# Patient Record
Sex: FEMALE | Race: ASIAN | NOT HISPANIC OR LATINO | URBAN - METROPOLITAN AREA
[De-identification: names, ages, dates, MRNs, and addresses within clinical notes are randomized per-mention and may not be internally consistent; named-entity substitution may affect disease eponyms.]

---

## 2020-04-01 ENCOUNTER — EMERGENCY (EMERGENCY)
Facility: HOSPITAL | Age: 55
LOS: 1 days | Discharge: HOME | End: 2020-04-01
Payer: MEDICAID

## 2020-04-01 VITALS
HEIGHT: 65 IN | TEMPERATURE: 98 F | HEART RATE: 80 BPM | SYSTOLIC BLOOD PRESSURE: 111 MMHG | DIASTOLIC BLOOD PRESSURE: 66 MMHG | WEIGHT: 151.02 LBS | OXYGEN SATURATION: 98 % | RESPIRATION RATE: 18 BRPM

## 2020-04-01 VITALS
RESPIRATION RATE: 18 BRPM | OXYGEN SATURATION: 96 % | DIASTOLIC BLOOD PRESSURE: 85 MMHG | HEART RATE: 78 BPM | SYSTOLIC BLOOD PRESSURE: 132 MMHG

## 2020-04-01 PROCEDURE — 99282 EMERGENCY DEPT VISIT SF MDM: CPT

## 2020-04-01 PROCEDURE — 99284 EMERGENCY DEPT VISIT MOD MDM: CPT

## 2020-04-01 NOTE — ED PROVIDER NOTE - PATIENT PORTAL LINK FT
You can access the FollowMyHealth Patient Portal offered by [facility] by registering at the following website: [email or web address]. By joining [employer]’s FollowMyHealth portal, you will also be able to view your health information using other applications (apps) compatible with our system.

## 2020-04-01 NOTE — ED PROVIDER NOTE - NSFOLLOWUPINSTRUCTIONS_ED_ALL_ED_FT
YOU WERE SEEN IN THE EMERGENCY DEPARTMENT FOR A VIRAL-ILLNESS. AT THIS TIME, COVID-19 CANNOT BE RULED OUT. YOU MUST REMAIN ISOLATED AT HOME FOR 14 DAYS MINIMUM UNTIL TOLD OTHERWISE TO PREVENT FURTHER SPREAD OF THIS DISEASE. ANYONE WHO LIVES WITH YOU MUST ALSO SELF ISOLATE.   PLEASE READ ATTACHED MATERIAL. AVOID ELDERLY AND IMMUNOCOMPROMISED POPULATION. WASH YOUR HANDS, AVOID TOUCHING YOUR FACE, AND SNEEZE/COUGH INTO THE CROOK OF YOUR ARM.   PLEASE READ ATTACHED MATERIAL.    If you wish to be tested for COVID19 you may call the [facility] COVID19 Hotline: [phone]    For fever/body aches:  Take Tylenol 650mg every 6 hrs as needed for pain.  Avoid taking ibuprofen (Advil, Motrin, etc.)    Stay well hydrated with water and electrolyte replacement solutions such as Pedialyte or the adult equivalent.     Return to the ED for worsening symptoms, shortness of breath, chest pain, inability to tolerate food/drink, loss of consciousness, or any other serious concerns.    -------------    What is a coronavirus?  Coronaviruses are a large family of viruses that cause illnesses ranging from the common cold to more severe diseases such as Middle East Respiratory Syndrome (MERS) and Severe Acute Respiratory Syndrome (SARS).    What is Novel Coronavirus (COVID-19)?  The Centers for Disease Control and Prevention (CDC) is closely monitoring the outbreak caused by COVID-19. For the latest information about COVID-19, visit the CDC website at CDC.gov/Coronavirus    How are coronaviruses spread?  Coronaviruses can be transmitted from person to person, usually after close contact with an infected  person (for example, in a household, workplace, or healthcare setting), via droplets that become airborne after a cough or sneeze. These droplets can then infect a nearby person. Transmission can also occur by touching recently contaminated surfaces.    Is there a treatment for a COVID-19?  There is no specific treatment for disease caused by COVID-19. However, many of the symptoms can be treated based on the patient’s clinical condition. Supportive care for infected persons can be highly effective.    What are the symptoms of coronavirus infection?  It depends on the virus, but common signs include fever and/or respiratory symptoms such as cough and shortness of breath. In more severe cases, infection can cause pneumonia, severe acute respiratory syndrome, kidney failure and even death. Fortunately, most cases of COVID-19 have an illness no different than the influenza (flu), with a majority of these patients having mild symptoms and overall mortality which appears to be not much different than the flu.    What can I do to protect myself?  The best precautionary measures:  – washing your hands  – covering your cough  – disinfecting surfaces  – it is also advisable to avoid close contact with anyone showing symptoms of respiratory illness such as coughing and sneezing  – those with symptoms should wear a surgical mask when around others    What can I do to protect those around me?  If you have been identified as someone who may be infected with COVID-19, we recommend you follow the self-isolation procedures outlined on the following page to protect those around you and to limit the spread of this virus.    We recommend the below precautionary steps from now until 14 days from when you returned from your travel or date of your last known possible contact:    — Do not go to work, school or public areas. Avoid using public transportation, ridesharing or taxis.  — As much as possible, separate yourself from other people in your home. If you can, you should stay in a room and away from other people. Also, you should use a separate bathroom if available.  — Wear the supplied mask whenever you are around other people.  — If you have a non-urgent medical appointment, please reschedule for a later date. If the appointment is urgent, please call the health care provider and tell them that you are on self-isolation for possible COVID-19. This will help the health care provider’s office take steps to keep other people from getting infected or exposed. If you can reschedule routine appointments, do so.  — Wash your hands often with soap and water for at least 15 to 20 seconds or clean your hands with an alcohol-based hand  that contains 60 to 95% alcohol, covering all surfaces of your hands and rubbing them together until they feel dry. Soap and water should be used preferentially if hands are visibly dirty.  — Cover your mouth and nose with a tissue when you cough or sneeze. Throw used tissues in a lined trash can. Immediately wash your hands.  — Avoid touching your eyes, nose, and mouth with your hands.  — Avoid sharing personal household items. You should not share dishes, drinking glasses, cups, eating utensils, towels, or bedding with other people or pets in your home. After using these items, they should be washed thoroughly with soap and water.  — Clean and disinfect all “high-touch” surfaces every day. High touch surfaces include counters, tabletops, doorknobs, light switches, remote controls, bathroom fixtures, toilets, phones, keyboards, tablets, and bedside tables. Also, clean any surfaces that may have blood, stool, or body fluids on them.

## 2020-04-01 NOTE — ED PROVIDER NOTE - OBJECTIVE STATEMENT
54y f no pmhx p/w cough. pt tested COVID+ 10 days ago she was tested after a coworker at a conference tested positive. she reports worsening cough, diffuse body aches and mild SOB walking several blocks. She was given a course of hydrochloroquine and Azithromycin by her PMD, had a CXR yesterday which had no focal findings. Denies chest pain, dizziness, LOC, fever, chills, palpitations, abd pain, N/V/D/C.

## 2020-04-01 NOTE — ED ADULT NURSE NOTE - OBJECTIVE STATEMENT
55 y/o female presents to ED c/o of bodyaches and mild dry cough for 2 weeks; found out covid+ 10 days ago. CXR done yesterday and was normal as per pt. Pt denies any fever or worsening SOB. Afebrile at this time. Pt tolerated ambulation and O2 SAT WNL. VS otherwise stable. 3 other family members at home are also covid+. Denies chest pain, n/v/d. Pt was taking hydrochloroquine and and Z-pack at home as prescribed by PCP.

## 2021-03-25 ENCOUNTER — RESULT REVIEW (OUTPATIENT)
Age: 56
End: 2021-03-25

## 2021-05-25 PROBLEM — Z78.9 OTHER SPECIFIED HEALTH STATUS: Chronic | Status: ACTIVE | Noted: 2020-04-01

## 2021-05-25 PROBLEM — Z00.00 ENCOUNTER FOR PREVENTIVE HEALTH EXAMINATION: Status: ACTIVE | Noted: 2021-05-25

## 2021-05-27 ENCOUNTER — APPOINTMENT (OUTPATIENT)
Dept: RADIATION ONCOLOGY | Facility: CLINIC | Age: 56
End: 2021-05-27
Payer: MEDICAID

## 2021-05-27 DIAGNOSIS — Z78.9 OTHER SPECIFIED HEALTH STATUS: ICD-10-CM

## 2021-05-27 PROCEDURE — 99204 OFFICE O/P NEW MOD 45 MIN: CPT | Mod: 25

## 2021-07-01 VITALS
TEMPERATURE: 98.6 F | DIASTOLIC BLOOD PRESSURE: 84 MMHG | WEIGHT: 167.55 LBS | SYSTOLIC BLOOD PRESSURE: 125 MMHG | HEART RATE: 74 BPM

## 2021-07-01 PROBLEM — Z78.9 NO PERTINENT PAST MEDICAL HISTORY: Status: RESOLVED | Noted: 2021-07-01 | Resolved: 2021-07-01

## 2021-07-01 RX ORDER — TRAMADOL HYDROCHLORIDE 50 MG/1
TABLET, COATED ORAL
Refills: 0 | Status: ACTIVE | COMMUNITY

## 2021-07-01 RX ORDER — OXYCODONE HYDROCHLORIDE 30 MG/1
TABLET ORAL
Refills: 0 | Status: ACTIVE | COMMUNITY

## 2021-07-01 RX ORDER — ZOLPIDEM TARTRATE 5 MG/1
TABLET, FILM COATED ORAL
Refills: 0 | Status: ACTIVE | COMMUNITY

## 2021-07-01 NOTE — PHYSICAL EXAM
[General Appearance - Well Developed] : well developed [General Appearance - Well Nourished] : well nourished [Normal] : no palpable adenopathy [No Focal Deficits] : no focal deficits [Oriented To Time, Place, And Person] : oriented to person, place, and time [Sensation] : the sensory exam was normal to light touch and pinprick [de-identified] : oral mucositis [de-identified] : neck pain [de-identified] : incision right breast well healed. no pain [de-identified] : diffuse pain in ribs, bilateral flanks,C7, T4, hips

## 2021-07-01 NOTE — LETTER GREETING
[( Thank you for referring [unfilled] for consultation for _____ )] : Thank you for referring [unfilled] for consultation for [unfilled] [Consult Letter:] : Your patient, [unfilled] was seen in my office today for consultation. [FreeTextEntry2] : Nasir Gondal, M.D

## 2021-07-01 NOTE — REVIEW OF SYSTEMS
[Fatigue] : fatigue [Joint Pain] : joint pain [Muscle Pain] : muscle pain [Negative] : Allergic/Immunologic [FreeTextEntry9] : bilateral flank , rib, lower and mid back pain

## 2021-07-01 NOTE — HISTORY OF PRESENT ILLNESS
[FreeTextEntry1] : SANTANA PORTER is a 55 year old Filipina Female who comes today with a diagnosis of Stage IV invasive carcinoma of the Right breast metastatic to the bone\par 3/25 right-sided US guided biopsy: invasive ductal carcinoma, [place] 6/9 9mm with DCIS\par 4/8 MRIright sided lesion and a satellite lesions span 5cm by 2.5cm\par US guided biopsy of 2 sites: invasive mammary cancer (right) and complex cyst (satellite)\par 4/14 biopsy of the left breast – benign\par 4/29/2021 PET CT scan: activity in the right breast and axillary nodes; multiple metabolic foci in the skeleton. Some of them correspond to the lytic lesions on the CT scan while others do not/Bones that are involved are in the cervical,thoracic, lumbar, sacral areas, ribs, and bones in the pelvis. Representative areas are: 0.6cm lytic lesion in C7, 1.1cm lytic lesion in L5.\par 5/13/2021 MRI lumbar spine: numerous areas of abnormal marrow signal (low T0btvwyu intensity with diffuse enhancement) seen throughout lower T, L and S spine consistent with diffuse metastatic disease. There is involvement of the posterior elements at multiple levels, as well as the ribs and iliac bones. \par The patient presents with pain emanating from flanks on both sides mainly and some pain in the upper and middle back. She recently began taking oxycodone which seems to reduce the pain in half (from 10 to 5); however,  worries about addiction and hence takes it at most once a day in the morning. Other times, she supplements wit tramadol which seems to also control pain to a similar degree. The patient also has arthritis and has pain from arthritis, as well. She just began chemotherapy regimen - doxetacel, carbo, Herceptin, perjeta – and received the 1st dose. \par

## 2022-01-12 NOTE — ED PROVIDER NOTE - CLINICAL SUMMARY MEDICAL DECISION MAKING FREE TEXT BOX
Chief Complaint   Patient presents with   • Follow-up     Acute Right shoulder pain        HISTORY OF PRESENT ILLNESS:  Becki Medrano is a 68 year old White female who presents for   1. Acute pain of right shoulder   Follow up right shoulder pain from 12/2/2021 office visit.  History:12 years ago patient had a frozen left shoulder when she ended up having surgery on it. She having pain in her right shoulder, originally seen by me for this last month at her [facility]. She said she may have injured it lifting 3 pound weights. She has tried applying heat and did start doing exercises with no relief. When seen last patient was started on Voltaren gel which she stopped because she started feeling an upset stomach while using it.  She also has been seeing OT, and has seen some improvement but not resolution. She said certain movements will cause pain in her bicep and shoulder. She said she is also feeling a pulling sensation in her upper arm. She is able to sleep better now.  Patient said she would tell her self \"no pain no gain\" therefore she would push her self with her therapy exercises and aggravate her symtpms.        REVIEW OF SYSTEMS:  Negative except for above.     I have reviewed the patient's medications and allergies, past medical, surgical, social and family history, updating these as appropriate.  See Histories section of EMR (electronic medical record) for a display of this information.    PHYSICAL EXAM:  GENERAL:  Alert and oriented x3, and in no acute distress.  CONSTITUTIONAL:  Alert, oriented, well-developed White female, in no acute distress.  Conversation and eye contact are appropriate for age.  VITALS:    Visit Vitals  /68   Pulse 92   Resp 16   Ht 5' 5\" (1.651 m)   Wt 56.7 kg (125 lb)   SpO2 99%   BMI 20.80 kg/m²          RIGHT SHOULDER:  Tender with internal rotation , weakness 4.5/5. Tenderness with abduction, mild weakness 4+/5. Unable to do lift off test    ASSESSMENT/PLAN:     1. Acute pain of  right shoulder   Follow up right shoulder pain from 12/2/2021 office visit.  History:12 years ago patient had a frozen left shoulder when she ended up having surgery on it. She having pain in her right shoulder, originally seen by me for this last month at her [facility]. She said she may have injured it lifting 3 pound weights. She has tried applying heat and did start doing exercises with no relief. When seen last patient was started on Voltaren gel which she stopped because she started feeling an upset stomach while using it.  She also has been seeing OT, and has seen some improvement but not resolution. She said certain movements will cause pain in her bicep and shoulder. She said she is also feeling a pulling sensation in her upper arm. She is able to sleep better now.  Patient said she would tell her self \"no pain no gain\" therefore she would push her self with her therapy exercises and aggravate her symtpms.   Has had some improvement in her shoulder pain. She may have a partial rotator cuff tear or labral pathology  Patient will have an MRI arthrogram ordered. We will call patient with results. She will hold off on OT for now until we get the results from her MRI.    .    Orders Placed This Encounter   • MRI Arthrogram Shoulder Right   • diazePAM (VALIUM) 5 MG tablet       Return in about 3 weeks (around 2/2/2022), or recheck shoulder after MRI.         On 1/12/2022, IChristine scribed the services personally performed by Luis Rodas MD  The documentation recorded by the scribe accurately and completely reflects the service(s) I personally performed and the decisions made by me.        The patient does not have high-risk features of a life-threatening URI infection (COVID or otherwise).o2 saturation is above 92 on room air resting as well as ambulating. VS were without sig abnormality. COVID testing was not performed as per hospital guidelines and limitations in testing capabilities.  Myself and/or the RN has had a long conversation with the patient regarding the reasons to return to the Emergency Department including but not limited to: worsening cough, shortness of breath, syncope, near-syncope, chest pain or any other concerns.  Patient was counseled extensively on self-quarantine protocol, hand washing, covering cough, cleansing of regularly used surfaces, return precautions, and supportive care measures to be taken.